# Patient Record
Sex: MALE | Race: WHITE | NOT HISPANIC OR LATINO | Employment: OTHER | URBAN - NONMETROPOLITAN AREA
[De-identification: names, ages, dates, MRNs, and addresses within clinical notes are randomized per-mention and may not be internally consistent; named-entity substitution may affect disease eponyms.]

---

## 2020-08-28 ENCOUNTER — APPOINTMENT (OUTPATIENT)
Dept: CT IMAGING | Facility: HOSPITAL | Age: 50
End: 2020-08-28

## 2020-08-28 ENCOUNTER — HOSPITAL ENCOUNTER (EMERGENCY)
Facility: HOSPITAL | Age: 50
Discharge: HOME | End: 2020-08-28
Attending: FAMILY MEDICINE | Admitting: FAMILY MEDICINE

## 2020-08-28 VITALS
HEART RATE: 74 BPM | DIASTOLIC BLOOD PRESSURE: 84 MMHG | RESPIRATION RATE: 17 BRPM | SYSTOLIC BLOOD PRESSURE: 142 MMHG | TEMPERATURE: 98.2 F | OXYGEN SATURATION: 98 %

## 2020-08-28 DIAGNOSIS — S02.2XXA CLOSED FRACTURE OF NASAL BONE, INITIAL ENCOUNTER: Primary | ICD-10-CM

## 2020-08-28 DIAGNOSIS — S01.511A LACERATION OF INTRAORAL SURFACE OF LIP, INITIAL ENCOUNTER: ICD-10-CM

## 2020-08-28 DIAGNOSIS — S01.21XA LACERATION OF NOSE, INITIAL ENCOUNTER: ICD-10-CM

## 2020-08-28 LAB — VALPROATE SERPL-MCNC: 83.7 MCG/ML (ref 50–125)

## 2020-08-28 PROCEDURE — 99284 EMERGENCY DEPT VISIT MOD MDM: CPT

## 2020-08-28 PROCEDURE — 70486 CT MAXILLOFACIAL W/O DYE: CPT

## 2020-08-28 PROCEDURE — 80164 ASSAY DIPROPYLACETIC ACD TOT: CPT | Performed by: FAMILY MEDICINE

## 2020-08-28 PROCEDURE — 96374 THER/PROPH/DIAG INJ IV PUSH: CPT

## 2020-08-28 PROCEDURE — 70450 CT HEAD/BRAIN W/O DYE: CPT

## 2020-08-28 PROCEDURE — 72125 CT NECK SPINE W/O DYE: CPT

## 2020-08-28 PROCEDURE — 96375 TX/PRO/DX INJ NEW DRUG ADDON: CPT

## 2020-08-28 PROCEDURE — 25010000002 LORAZEPAM PER 2 MG: Performed by: NURSE PRACTITIONER

## 2020-08-28 PROCEDURE — 25010000002 KETOROLAC TROMETHAMINE PER 15 MG: Performed by: NURSE PRACTITIONER

## 2020-08-28 PROCEDURE — 96376 TX/PRO/DX INJ SAME DRUG ADON: CPT

## 2020-08-28 RX ORDER — CEPHALEXIN 500 MG/1
500 CAPSULE ORAL 2 TIMES DAILY
Qty: 20 CAPSULE | Refills: 0 | Status: SHIPPED | OUTPATIENT
Start: 2020-08-28 | End: 2020-09-07

## 2020-08-28 RX ORDER — DOCUSATE CALCIUM 240 MG
240 CAPSULE ORAL 2 TIMES DAILY
COMMUNITY

## 2020-08-28 RX ORDER — DIAPER,BRIEF,INFANT-TODD,DISP
EACH MISCELLANEOUS ONCE
Status: COMPLETED | OUTPATIENT
Start: 2020-08-28 | End: 2020-08-28

## 2020-08-28 RX ORDER — KETOROLAC TROMETHAMINE 30 MG/ML
30 INJECTION, SOLUTION INTRAMUSCULAR; INTRAVENOUS EVERY 6 HOURS PRN
Status: DISCONTINUED | OUTPATIENT
Start: 2020-08-28 | End: 2020-08-28 | Stop reason: HOSPADM

## 2020-08-28 RX ORDER — DIVALPROEX SODIUM 500 MG/1
1000 TABLET, EXTENDED RELEASE ORAL DAILY
COMMUNITY

## 2020-08-28 RX ORDER — PREDNISOLONE ACETATE 10 MG/ML
1 SUSPENSION/ DROPS OPHTHALMIC 4 TIMES DAILY
COMMUNITY

## 2020-08-28 RX ORDER — LORAZEPAM 2 MG/ML
1 INJECTION INTRAMUSCULAR ONCE
Status: COMPLETED | OUTPATIENT
Start: 2020-08-28 | End: 2020-08-28

## 2020-08-28 RX ORDER — MELATONIN
1000 DAILY
COMMUNITY

## 2020-08-28 RX ORDER — SODIUM CHLORIDE 0.9 % (FLUSH) 0.9 %
10 SYRINGE (ML) INJECTION AS NEEDED
Status: DISCONTINUED | OUTPATIENT
Start: 2020-08-28 | End: 2020-08-28 | Stop reason: HOSPADM

## 2020-08-28 RX ORDER — LIDOCAINE HYDROCHLORIDE 10 MG/ML
10 INJECTION, SOLUTION EPIDURAL; INFILTRATION; INTRACAUDAL; PERINEURAL ONCE
Status: COMPLETED | OUTPATIENT
Start: 2020-08-28 | End: 2020-08-28

## 2020-08-28 RX ORDER — SIMETHICONE 125 MG
125 TABLET,CHEWABLE ORAL EVERY 6 HOURS PRN
COMMUNITY

## 2020-08-28 RX ORDER — TRAMADOL HYDROCHLORIDE 50 MG/1
50 TABLET ORAL EVERY 6 HOURS PRN
Qty: 12 TABLET | Refills: 0 | Status: SHIPPED | OUTPATIENT
Start: 2020-08-28

## 2020-08-28 RX ADMIN — KETOROLAC TROMETHAMINE 30 MG: 30 INJECTION, SOLUTION INTRAMUSCULAR at 19:35

## 2020-08-28 RX ADMIN — LORAZEPAM 1 MG: 2 INJECTION INTRAMUSCULAR; INTRAVENOUS at 18:20

## 2020-08-28 RX ADMIN — LORAZEPAM 1 MG: 2 INJECTION INTRAMUSCULAR; INTRAVENOUS at 14:38

## 2020-08-28 RX ADMIN — BACITRACIN: 500 OINTMENT TOPICAL at 18:26

## 2020-08-28 RX ADMIN — LIDOCAINE HYDROCHLORIDE 10 ML: 10 INJECTION, SOLUTION EPIDURAL; INFILTRATION; INTRACAUDAL; PERINEURAL at 18:30

## 2020-09-08 ENCOUNTER — OFFICE VISIT (OUTPATIENT)
Dept: OTOLARYNGOLOGY | Facility: CLINIC | Age: 50
End: 2020-09-08

## 2020-09-08 VITALS — TEMPERATURE: 97.4 F | BODY MASS INDEX: 19.66 KG/M2 | WEIGHT: 118 LBS | HEIGHT: 65 IN

## 2020-09-08 DIAGNOSIS — S02.2XXB OPEN FRACTURE OF NASAL BONE, INITIAL ENCOUNTER: Primary | ICD-10-CM

## 2020-09-08 PROCEDURE — 99202 OFFICE O/P NEW SF 15 MIN: CPT | Performed by: OTOLARYNGOLOGY

## 2020-09-08 NOTE — PROGRESS NOTES
"Subjective   Clarke Cunningham Jr. is a 49 y.o. male.     History of Present Illness   Patient is a resident of [facility] and is noncommunicative.  History is obtained through the medical record.  Patient reportedly had falls with nasal injury including a laceration that was left open.  Has a history of multiple injuries.    The following portions of the patient's history were reviewed and updated as appropriate: allergies, current medications, past family history, past medical history, past social history, past surgical history and problem list.      Clarke Cunningham Jr. reports that he has never smoked. He does not have any smokeless tobacco history on file. He reports that he drank alcohol. He reports that he has current or past drug history.  Patient is not a tobacco user and has not been counseled for use of tobacco products    History reviewed. No pertinent family history.    Allergies   Allergen Reactions   • Influenza Vaccines Other (See Comments)         • Lithium Other (See Comments)     \"Intolerance\"         Current Outpatient Medications:   •  Calcium Carb-Cholecalciferol (CALCIUM-VITAMIN D) 600-400 MG-UNIT tablet, Take  by mouth., Disp: , Rfl:   •  cholecalciferol (VITAMIN D3) 25 MCG (1000 UT) tablet, Take 1,000 Units by mouth Daily., Disp: , Rfl:   •  divalproex (DEPAKOTE) 500 MG 24 hr tablet, Take 1,000 mg by mouth Daily., Disp: , Rfl:   •  docusate calcium (SURFAK) 240 MG capsule, Take 240 mg by mouth 2 (Two) Times a Day., Disp: , Rfl:   •  prednisoLONE acetate (PRED FORTE) 1 % ophthalmic suspension, 1 drop 4 (Four) Times a Day., Disp: , Rfl:   •  simethicone (MYLICON) 125 MG chewable tablet, Chew 125 mg Every 6 (Six) Hours As Needed for Flatulence., Disp: , Rfl:   •  traMADol (ULTRAM) 50 MG tablet, Take 1 tablet by mouth Every 6 (Six) Hours As Needed for Moderate Pain ., Disp: 12 tablet, Rfl: 0    Past Medical History:   Diagnosis Date   • Anemia    • Bipolar 1 disorder (CMS/HCC)    • Cerebral " palsy (CMS/HCC)    • Congenital prolapsed rectum    • Constipation    • Drug induced myotonia    • GERD (gastroesophageal reflux disease)    • Intellectual disability    • Osteopenia    • Osteoporosis    • Pes planus    • Thrombocytopenia (CMS/HCC)    • Vitamin D deficiency        Past Surgical History:   Procedure Laterality Date   • CATARACT EXTRACTION           Review of Systems   Unable to perform ROS: Patient nonverbal           Objective   Physical Exam  General: Nonverbal male in no acute distress.  No stridor or stertor.  Zygomatic arches appear to be without step-off.  Orbital rims appear intact.  Ears: No gross discharge  Nose: There is a laceration to the left of midline that is crusted but without purulence and appears to be healing appropriately.  Nasal dorsum shows evidence of multiple fractures but is not significantly displaced.  Intranasally there is no evidence of hematoma.  Oral cavity: Patient would not allow examination.  Neck: No adenopathy.  No abnormal masses in the parotid or submandibular ducts.        Assessment/Plan   Clarke was seen today for nasal fracture.    Diagnoses and all orders for this visit:    Open fracture of nasal bone, initial encounter      Plan: Patient appears to be healing appropriately without intervention and no evidence of septal hematoma.  Would not recommend any surgery at this point.  May follow-up with me as needed.

## 2022-02-09 DIAGNOSIS — Z12.11 ENCOUNTER FOR SCREENING COLONOSCOPY: Primary | ICD-10-CM

## 2022-04-18 ENCOUNTER — OFFICE VISIT (OUTPATIENT)
Dept: GASTROENTEROLOGY | Facility: CLINIC | Age: 52
End: 2022-04-18

## 2022-04-18 VITALS
WEIGHT: 117 LBS | TEMPERATURE: 97.7 F | DIASTOLIC BLOOD PRESSURE: 70 MMHG | HEIGHT: 65 IN | HEART RATE: 98 BPM | SYSTOLIC BLOOD PRESSURE: 110 MMHG | BODY MASS INDEX: 19.49 KG/M2

## 2022-04-18 DIAGNOSIS — Z12.11 ENCOUNTER FOR SCREENING FOR MALIGNANT NEOPLASM OF COLON: Primary | ICD-10-CM

## 2022-04-18 PROCEDURE — S0260 H&P FOR SURGERY: HCPCS | Performed by: NURSE PRACTITIONER

## 2022-04-18 NOTE — PROGRESS NOTES
Chief Complaint   Patient presents with   • Colonoscopy     Screening colon       PCP: Provider, No Known  REFER: Leigh Hough AP*    Subjective     HPI    Clarke Cunningham Jr. is a 51 y.o. male who presents to office for preventative maintenance. He is unable to provide meaningful history.  Information obtained through nurse from group home that is present with him.  His father is POA.   There is not  a personal history of colon polyps.  There is not a history of colon cancer.  .    His last colonoscopy-no previous colonoscopy   Unknown family history of colon cancer or colon polyps.       Past Medical History:   Diagnosis Date   • Anemia    • Bipolar 1 disorder (HCC)    • Cerebral palsy (HCC)    • Congenital prolapsed rectum    • Constipation    • Drug induced myotonia    • GERD (gastroesophageal reflux disease)    • Intellectual disability    • Osteopenia    • Osteoporosis    • Pes planus    • Thrombocytopenia (HCC)    • Vitamin D deficiency      Past Surgical History:   Procedure Laterality Date   • CATARACT EXTRACTION       Outpatient Medications Marked as Taking for the 4/18/22 encounter (Office Visit) with Eliezer Fletcher APRN   Medication Sig Dispense Refill   • Calcium Carb-Cholecalciferol (CALCIUM-VITAMIN D) 600-400 MG-UNIT tablet Take  by mouth.     • cholecalciferol (VITAMIN D3) 25 MCG (1000 UT) tablet Take 1,000 Units by mouth Daily.     • divalproex (DEPAKOTE) 500 MG 24 hr tablet Take 1,000 mg by mouth Daily.     • docusate calcium (SURFAK) 240 MG capsule Take 240 mg by mouth 2 (Two) Times a Day.     • prednisoLONE acetate (PRED FORTE) 1 % ophthalmic suspension 1 drop 4 (Four) Times a Day.     • simethicone (MYLICON) 125 MG chewable tablet Chew 125 mg Every 6 (Six) Hours As Needed for Flatulence.     • traMADol (ULTRAM) 50 MG tablet Take 1 tablet by mouth Every 6 (Six) Hours As Needed for Moderate Pain . 12 tablet 0     Allergies   Allergen Reactions   • Influenza Vaccines Other (See  "Comments)         • Lithium Other (See Comments)     \"Intolerance\"     Social History     Socioeconomic History   • Marital status: Single   Tobacco Use   • Smoking status: Never Smoker   • Smokeless tobacco: Never Used   Vaping Use   • Vaping Use: Never used   Substance and Sexual Activity   • Alcohol use: Never   • Drug use: Never   • Sexual activity: Not Currently     Review of Systems     Unable to obtain complete ROS due to mental status    Objective   Vitals:    04/18/22 1322   BP: 110/70   Pulse: 98   Temp: 97.7 °F (36.5 °C)     Physical Exam  Constitutional:       Appearance: Normal appearance. He is well-developed.   Eyes:      General: No scleral icterus.  Cardiovascular:      Rate and Rhythm: Regular rhythm.      Heart sounds: Normal heart sounds. No murmur heard.  Pulmonary:      Effort: Pulmonary effort is normal. No accessory muscle usage.      Breath sounds: Normal breath sounds.   Abdominal:      General: Bowel sounds are normal. There is no distension.      Palpations: Abdomen is soft. There is no mass.      Tenderness: There is no abdominal tenderness. There is no guarding or rebound.   Skin:     General: Skin is warm and dry.      Coloration: Skin is not jaundiced.   Neurological:      Mental Status: He is alert.   Psychiatric:         Behavior: Behavior is cooperative.         Cognition and Memory: Cognition is impaired.         Judgment: Judgment is inappropriate.       Imaging Results (Most Recent)     None        Body mass index is 19.77 kg/m².    Assessment/Plan   Diagnoses and all orders for this visit:    1. Encounter for screening for malignant neoplasm of colon (Primary)  -     Case Request; Standing  -     Case Request    Other orders  -     polyethylene glycol (GoLYTELY) 236 g solution; Take 3,785 mL by mouth 1 (One) Time for 1 dose. Take as directed  Dispense: 3350 mL; Refill: 0      COLONOSCOPY WITH ANESTHESIA (N/A)      Clarke Cunningham Jr. states he has a rectal prolapse and " believes Clarke Cunningham Jr. will consume prep for colonoscopy.  She states he does not have difficulty walking and assured he would be supervised during prep for colonoscopy.  I expressed my concerns about a fall.  I will call Clarke Cunningham Jr. father to obtain appropriate consent for colonoscopy and further discuss risks/benefits/indications of colonoscopy.      All risks, benefits, alternatives, and indications of colonoscopy procedure have been discussed with the patient. Risks to include perforation of the colon requiring possible surgery or colostomy, risk of bleeding from biopsies or removal of colon tissue, possibility of missing a colon polyp or cancer, or adverse drug reaction.  Benefits to include the diagnosis and management of disease of the colon and rectum. Alternatives to include barium enema, radiographic evaluation, lab testing or no intervention.      Eliezer Fletcher, APRN  04/18/22        There are no Patient Instructions on file for this visit.

## 2022-04-19 PROBLEM — Z12.11 ENCOUNTER FOR SCREENING FOR MALIGNANT NEOPLASM OF COLON: Status: ACTIVE | Noted: 2022-04-19

## 2022-05-04 ENCOUNTER — ANESTHESIA (OUTPATIENT)
Dept: GASTROENTEROLOGY | Facility: HOSPITAL | Age: 52
End: 2022-05-04

## 2022-05-04 ENCOUNTER — HOSPITAL ENCOUNTER (OUTPATIENT)
Facility: HOSPITAL | Age: 52
Setting detail: HOSPITAL OUTPATIENT SURGERY
Discharge: HOME | End: 2022-05-04
Attending: INTERNAL MEDICINE | Admitting: INTERNAL MEDICINE

## 2022-05-04 ENCOUNTER — TELEPHONE (OUTPATIENT)
Dept: GASTROENTEROLOGY | Facility: CLINIC | Age: 52
End: 2022-05-04

## 2022-05-04 ENCOUNTER — ANESTHESIA EVENT (OUTPATIENT)
Dept: GASTROENTEROLOGY | Facility: HOSPITAL | Age: 52
End: 2022-05-04

## 2022-05-04 VITALS
OXYGEN SATURATION: 96 % | HEIGHT: 65 IN | DIASTOLIC BLOOD PRESSURE: 72 MMHG | TEMPERATURE: 97.4 F | BODY MASS INDEX: 19.49 KG/M2 | WEIGHT: 117 LBS | RESPIRATION RATE: 18 BRPM | SYSTOLIC BLOOD PRESSURE: 98 MMHG | HEART RATE: 43 BPM

## 2022-05-04 DIAGNOSIS — Z12.11 ENCOUNTER FOR SCREENING FOR MALIGNANT NEOPLASM OF COLON: ICD-10-CM

## 2022-05-04 PROCEDURE — 25010000002 PROPOFOL 10 MG/ML EMULSION: Performed by: NURSE ANESTHETIST, CERTIFIED REGISTERED

## 2022-05-04 PROCEDURE — G0121 COLON CA SCRN NOT HI RSK IND: HCPCS | Performed by: INTERNAL MEDICINE

## 2022-05-04 RX ORDER — LIDOCAINE HYDROCHLORIDE 10 MG/ML
0.5 INJECTION, SOLUTION EPIDURAL; INFILTRATION; INTRACAUDAL; PERINEURAL ONCE AS NEEDED
Status: CANCELLED | OUTPATIENT
Start: 2022-05-04

## 2022-05-04 RX ORDER — SODIUM CHLORIDE 9 MG/ML
500 INJECTION, SOLUTION INTRAVENOUS CONTINUOUS PRN
Status: DISCONTINUED | OUTPATIENT
Start: 2022-05-04 | End: 2022-05-04 | Stop reason: HOSPADM

## 2022-05-04 RX ORDER — PROPOFOL 10 MG/ML
VIAL (ML) INTRAVENOUS AS NEEDED
Status: DISCONTINUED | OUTPATIENT
Start: 2022-05-04 | End: 2022-05-04 | Stop reason: SURG

## 2022-05-04 RX ORDER — SODIUM CHLORIDE 0.9 % (FLUSH) 0.9 %
10 SYRINGE (ML) INJECTION AS NEEDED
Status: DISCONTINUED | OUTPATIENT
Start: 2022-05-04 | End: 2022-05-04 | Stop reason: HOSPADM

## 2022-05-04 RX ADMIN — PROPOFOL 60 MG: 10 INJECTION, EMULSION INTRAVENOUS at 10:12

## 2022-05-04 RX ADMIN — SODIUM CHLORIDE 500 ML: 9 INJECTION, SOLUTION INTRAVENOUS at 09:06

## 2022-05-04 NOTE — ANESTHESIA POSTPROCEDURE EVALUATION
Patient: Clarke Cunningham Jr.    Procedure Summary     Date: 05/04/22 Room / Location: [facility] ENDOSCOPY 5 / [facility] ENDOSCOPY    Anesthesia Start: 1011 Anesthesia Stop: 1013    Procedure: COLONOSCOPY WITH ANESTHESIA (N/A ) Diagnosis:       Encounter for screening for malignant neoplasm of colon      (Encounter for screening for malignant neoplasm of colon [Z12.11])    Surgeons: Bobby Montes DO Provider: Sean Linares CRNA    Anesthesia Type: MAC ASA Status: 3          Anesthesia Type: MAC    Vitals  Vitals Value Taken Time   BP 87/56 05/04/22 1026   Temp     Pulse 45 05/04/22 1031   Resp 20 05/04/22 1020   SpO2 95 % 05/04/22 1031   Vitals shown include unvalidated device data.        Post Anesthesia Care and Evaluation    Patient location during evaluation: PHASE II  Patient participation: complete - patient participated  Level of consciousness: awake and alert  Pain management: adequate  Airway patency: patent  Anesthetic complications: No anesthetic complications  PONV Status: none  Cardiovascular status: acceptable and stable  Respiratory status: acceptable and unassisted  Hydration status: acceptable

## 2022-05-04 NOTE — ANESTHESIA PREPROCEDURE EVALUATION
Anesthesia Evaluation     Patient summary reviewed   no history of anesthetic complications:  NPO Solid Status: > 8 hours  NPO Liquid Status: > 2 hours           Airway   Mallampati: II  TM distance: >3 FB  Neck ROM: full  No difficulty expected  Dental    (+) poor dentition        Pulmonary    (-) asthma, sleep apnea, not a smoker  Cardiovascular - negative cardio ROS  Exercise tolerance: good (4-7 METS)        Neuro/Psych  (+) psychiatric history Bipolar,      ROS Comment: CP  Intellectual disability  GI/Hepatic/Renal/Endo    (+)  GERD,      Musculoskeletal     Abdominal    Substance History      OB/GYN          Other        ROS/Med Hx Other: Congenital prolapsed rectum                Anesthesia Plan    ASA 3     MAC     intravenous induction     Plan/risks discussed with: caregiver at bedside, POA previously signed consent.        CODE STATUS:

## 2022-10-31 ENCOUNTER — APPOINTMENT (OUTPATIENT)
Dept: CT IMAGING | Facility: HOSPITAL | Age: 52
End: 2022-10-31

## 2022-10-31 ENCOUNTER — HOSPITAL ENCOUNTER (EMERGENCY)
Facility: HOSPITAL | Age: 52
Discharge: HOME | End: 2022-10-31
Attending: EMERGENCY MEDICINE | Admitting: EMERGENCY MEDICINE

## 2022-10-31 VITALS
DIASTOLIC BLOOD PRESSURE: 86 MMHG | OXYGEN SATURATION: 98 % | HEIGHT: 65 IN | SYSTOLIC BLOOD PRESSURE: 131 MMHG | WEIGHT: 120 LBS | TEMPERATURE: 97.7 F | HEART RATE: 70 BPM | RESPIRATION RATE: 16 BRPM | BODY MASS INDEX: 19.99 KG/M2

## 2022-10-31 DIAGNOSIS — R22.0 SWELLING OF UPPER LIP: Primary | ICD-10-CM

## 2022-10-31 DIAGNOSIS — S42.002A CLOSED NONDISPLACED FRACTURE OF LEFT CLAVICLE, UNSPECIFIED PART OF CLAVICLE, INITIAL ENCOUNTER: ICD-10-CM

## 2022-10-31 PROCEDURE — 70486 CT MAXILLOFACIAL W/O DYE: CPT

## 2022-10-31 PROCEDURE — 70450 CT HEAD/BRAIN W/O DYE: CPT

## 2022-10-31 PROCEDURE — 99283 EMERGENCY DEPT VISIT LOW MDM: CPT

## 2022-10-31 PROCEDURE — 72125 CT NECK SPINE W/O DYE: CPT

## 2022-10-31 RX ORDER — IBUPROFEN 600 MG/1
600 TABLET ORAL ONCE
Status: COMPLETED | OUTPATIENT
Start: 2022-10-31 | End: 2022-10-31

## 2022-10-31 RX ORDER — TRAMADOL HYDROCHLORIDE 50 MG/1
50 TABLET ORAL ONCE
Status: COMPLETED | OUTPATIENT
Start: 2022-10-31 | End: 2022-10-31

## 2022-10-31 RX ADMIN — TRAMADOL HYDROCHLORIDE 50 MG: 50 TABLET, FILM COATED ORAL at 10:17

## 2022-10-31 RX ADMIN — IBUPROFEN 600 MG: 600 TABLET, FILM COATED ORAL at 10:16

## 2022-11-11 ENCOUNTER — OFFICE VISIT (OUTPATIENT)
Dept: ORTHOPEDIC SURGERY | Facility: CLINIC | Age: 52
End: 2022-11-11
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
## 2022-11-11 DIAGNOSIS — S42.022S CLOSED DISPLACED FRACTURE OF SHAFT OF LEFT CLAVICLE, SEQUELA: Primary | ICD-10-CM

## 2022-11-11 DIAGNOSIS — M89.8X1 PAIN OF LEFT CLAVICLE: Primary | ICD-10-CM

## 2022-11-11 PROBLEM — S42.002A CLOSED NONDISPLACED FRACTURE OF LEFT CLAVICLE: Status: ACTIVE | Noted: 2022-11-11

## 2022-11-11 PROCEDURE — 99203 OFFICE O/P NEW LOW 30 MIN: CPT | Performed by: NURSE PRACTITIONER

## 2022-11-11 NOTE — PROGRESS NOTES
"Clarke Cunningham Jr. is a 51 y.o. male   Primary provider:  Provider, No Known       Chief Complaint   Patient presents with   • Left Clavicle - Fracture, Initial Evaluation       HISTORY OF PRESENT ILLNESS:    Mr. Cunningham is a pleasant 51-year-old male, he is accompanied with his .  Patient has mental disabilities and requires full-time care.  His  reports patient fell on 10/30/2022.  At that time it seemed that most of his pain was coming from his face, however a shoulder x-ray was performed and showed a clavicle fracture.  At first  reports it was unsure if this was a new or an old injury, however she reports [facility] found old x-ray reports to show that clavicle fracture had been present previously.  She reports patient seems to have no problem using left arm.  Patient has not been showing any signs of pain.  He is sent for new x-rays today.        Arm Injury   The incident occurred more than 1 week ago. The injury mechanism was a fall. The pain is present in the left clavicle.        CONCURRENT MEDICAL HISTORY:    Past Medical History:   Diagnosis Date   • Anemia    • Bipolar 1 disorder (HCC)    • Cerebral palsy (HCC)    • Congenital prolapsed rectum    • Constipation    • Drug induced myotonia    • GERD (gastroesophageal reflux disease)    • Intellectual disability    • Osteopenia    • Osteoporosis    • Pes planus    • Thrombocytopenia (HCC)    • Vitamin D deficiency        Allergies   Allergen Reactions   • Influenza Vaccines Other (See Comments)         • Lithium Other (See Comments)     \"Intolerance\"         Current Outpatient Medications:   •  Calcium Carb-Cholecalciferol (CALCIUM-VITAMIN D) 600-400 MG-UNIT tablet, Take  by mouth., Disp: , Rfl:   •  cholecalciferol (VITAMIN D3) 25 MCG (1000 UT) tablet, Take 1,000 Units by mouth Daily., Disp: , Rfl:   •  divalproex (DEPAKOTE) 500 MG 24 hr tablet, Take 1,000 mg by mouth Daily., Disp: , Rfl:   •  docusate calcium " (SURFAK) 240 MG capsule, Take 240 mg by mouth 2 (Two) Times a Day., Disp: , Rfl:   •  prednisoLONE acetate (PRED FORTE) 1 % ophthalmic suspension, 1 drop 4 (Four) Times a Day., Disp: , Rfl:   •  simethicone (MYLICON) 125 MG chewable tablet, Chew 125 mg Every 6 (Six) Hours As Needed for Flatulence., Disp: , Rfl:   •  traMADol (ULTRAM) 50 MG tablet, Take 1 tablet by mouth Every 6 (Six) Hours As Needed for Moderate Pain ., Disp: 12 tablet, Rfl: 0    Past Surgical History:   Procedure Laterality Date   • CATARACT EXTRACTION     • COLONOSCOPY     • COLONOSCOPY N/A 5/4/2022    Procedure: COLONOSCOPY WITH ANESTHESIA;  Surgeon: Bobby Montes DO;  Location: [facility] ENDOSCOPY;  Service: Gastroenterology;  Laterality: N/A;  pre screen  post inadequate prep  pcp vidal amos       History reviewed. No pertinent family history.     Social History     Socioeconomic History   • Marital status: Single   Tobacco Use   • Smoking status: Never   • Smokeless tobacco: Never   Vaping Use   • Vaping Use: Never used   Substance and Sexual Activity   • Alcohol use: Never   • Drug use: Never   • Sexual activity: Not Currently        Review of Systems   Constitutional: Negative.    HENT: Negative.    Eyes: Negative.    Respiratory: Negative.    Cardiovascular: Negative.    Gastrointestinal: Negative.    Endocrine: Negative.    Genitourinary: Negative.    Musculoskeletal: Negative.    Skin: Negative.    Allergic/Immunologic: Negative.    Neurological: Negative.    Hematological: Negative.    Psychiatric/Behavioral: Negative.        PHYSICAL EXAMINATION:       There were no vitals taken for this visit.    Physical Exam  Vitals and nursing note reviewed.   Constitutional:       General: He is not in acute distress.     Appearance: He is well-developed. He is not toxic-appearing.   HENT:      Head: Normocephalic.   Pulmonary:      Effort: Pulmonary effort is normal. No respiratory distress.   Skin:     General: Skin is warm and dry.    Neurological:      Mental Status: Mental status is at baseline.   Psychiatric:         Speech: He is noncommunicative.         GAIT:     []  Normal  []  Antalgic    Assistive device: []  None  []  Walker     []  Crutches  []  Cane     [x]  Wheelchair  []  Stretcher    Left Shoulder Exam     Comments:  Disabilities make it difficult to perform formal exam, however patient is seen using left arm today with no issues or outward appearances of pain.  Fingers are warm, pink, good capillary refill and normal sensation.  He has no outward appearances of pain with palpation of left clavicle.                CT Cervical Spine Without Contrast    Result Date: 10/31/2022  Narrative: CT C-spine October 31, 2022 INDICATION: Posttraumatic pain TECHNIQUE: Spiral images obtained through the neck. Sagittal, axial and coronal reformatted images generated retrospectively. This exam was performed according to our departmental dose-optimization program, which includes automated exposure control, adjustment of the mA and/or kV according to patient size and/or use of iterative reconstruction technique. FINDINGS: Study rather limited secondary to patient motion artifact degrading image quality. No definite acute fracture, subluxation or precervical soft tissue swelling. Atlantoaxial articulation grossly normal. Stable lucency posterior C6 vertebra compared with October 28, 2020. Mild disc space narrowing and hypertrophic spurring diffusely. No evidence of jumped facet.     Impression: Rather limited study secondary to patient cooperation issues. No definite acute or worrisome focal bony abnormality. Electronically signed by:  Sean Mc MD  10/31/2022 12:08 PM CDT Workstation: [id number]        ASSESSMENT:    Diagnoses and all orders for this visit:    Closed displaced fracture of shaft of left clavicle, sequela    Accidental fall, initial encounter          PLAN      Clinical history and exam suggest the displaced fracture of the left  clavicle is an old injury.  Request that  sign paperwork for me to obtain records from [facility] so that this may be well documented.  Signs and symptoms to report and when to seek care explained.  Patient's  verbalized understanding.  Patient to return to this office as needed.    Return if symptoms worsen or fail to improve.    EMR Dragon/Transciption Disclaimer: Some of this note may be an electronic transcription/translation of spoken language to printed text.  The electronic translation of spoken language may permit erroneous, or at times, nonsensical words or phrases to be inadvertently transcribed. Although I have reviewed the note for such errors, some may still exist.       Time spent of a minimum of 30 minutes including the face to face evaluation, reviewing of medical history and prior medial records, reviewing of diagnostic studies, ordering additional tests, documentation, patient education and coordination of care.         This document has been electronically signed by Heidi RANDOLPH on November 11, 2022 09:59 CST

## 2023-05-09 ENCOUNTER — APPOINTMENT (OUTPATIENT)
Dept: GENERAL RADIOLOGY | Facility: HOSPITAL | Age: 53
End: 2023-05-09
Payer: MEDICARE

## 2023-05-09 ENCOUNTER — APPOINTMENT (OUTPATIENT)
Dept: CT IMAGING | Facility: HOSPITAL | Age: 53
End: 2023-05-09
Payer: MEDICARE

## 2023-05-09 ENCOUNTER — HOSPITAL ENCOUNTER (EMERGENCY)
Facility: HOSPITAL | Age: 53
Discharge: HOME | End: 2023-05-09
Attending: STUDENT IN AN ORGANIZED HEALTH CARE EDUCATION/TRAINING PROGRAM | Admitting: STUDENT IN AN ORGANIZED HEALTH CARE EDUCATION/TRAINING PROGRAM
Payer: MEDICARE

## 2023-05-09 VITALS
TEMPERATURE: 100.3 F | DIASTOLIC BLOOD PRESSURE: 58 MMHG | SYSTOLIC BLOOD PRESSURE: 122 MMHG | RESPIRATION RATE: 20 BRPM | HEART RATE: 80 BPM | OXYGEN SATURATION: 98 %

## 2023-05-09 DIAGNOSIS — J18.9 PNEUMONIA OF LEFT LOWER LOBE DUE TO INFECTIOUS ORGANISM: Primary | ICD-10-CM

## 2023-05-09 LAB
ALBUMIN SERPL-MCNC: 4 G/DL (ref 3.5–5.2)
ALBUMIN/GLOB SERPL: 1 G/DL
ALP SERPL-CCNC: 90 U/L (ref 39–117)
ALT SERPL W P-5'-P-CCNC: 18 U/L (ref 1–41)
ANION GAP SERPL CALCULATED.3IONS-SCNC: 11 MMOL/L (ref 5–15)
AST SERPL-CCNC: 18 U/L (ref 1–40)
BASOPHILS # BLD AUTO: 0.04 10*3/MM3 (ref 0–0.2)
BASOPHILS NFR BLD AUTO: 0.3 % (ref 0–1.5)
BILIRUB SERPL-MCNC: 0.3 MG/DL (ref 0–1.2)
BUN SERPL-MCNC: 22 MG/DL (ref 6–20)
BUN/CREAT SERPL: 29.3 (ref 7–25)
CALCIUM SPEC-SCNC: 9.9 MG/DL (ref 8.6–10.5)
CHLORIDE SERPL-SCNC: 100 MMOL/L (ref 98–107)
CO2 SERPL-SCNC: 27 MMOL/L (ref 22–29)
CREAT SERPL-MCNC: 0.75 MG/DL (ref 0.76–1.27)
DEPRECATED RDW RBC AUTO: 49 FL (ref 37–54)
EGFRCR SERPLBLD CKD-EPI 2021: 109.3 ML/MIN/1.73
EOSINOPHIL # BLD AUTO: 0 10*3/MM3 (ref 0–0.4)
EOSINOPHIL NFR BLD AUTO: 0 % (ref 0.3–6.2)
ERYTHROCYTE [DISTWIDTH] IN BLOOD BY AUTOMATED COUNT: 13.7 % (ref 12.3–15.4)
GLOBULIN UR ELPH-MCNC: 4.1 GM/DL
GLUCOSE SERPL-MCNC: 99 MG/DL (ref 65–99)
HCT VFR BLD AUTO: 36.3 % (ref 37.5–51)
HGB BLD-MCNC: 11.9 G/DL (ref 13–17.7)
HOLD SPECIMEN: NORMAL
IMM GRANULOCYTES # BLD AUTO: 0.08 10*3/MM3 (ref 0–0.05)
IMM GRANULOCYTES NFR BLD AUTO: 0.5 % (ref 0–0.5)
LYMPHOCYTES # BLD AUTO: 0.67 10*3/MM3 (ref 0.7–3.1)
LYMPHOCYTES NFR BLD AUTO: 4.5 % (ref 19.6–45.3)
MCH RBC QN AUTO: 31.3 PG (ref 26.6–33)
MCHC RBC AUTO-ENTMCNC: 32.8 G/DL (ref 31.5–35.7)
MCV RBC AUTO: 95.5 FL (ref 79–97)
MONOCYTES # BLD AUTO: 1.44 10*3/MM3 (ref 0.1–0.9)
MONOCYTES NFR BLD AUTO: 9.7 % (ref 5–12)
NEUTROPHILS NFR BLD AUTO: 12.6 10*3/MM3 (ref 1.7–7)
NEUTROPHILS NFR BLD AUTO: 85 % (ref 42.7–76)
NRBC BLD AUTO-RTO: 0 /100 WBC (ref 0–0.2)
PLATELET # BLD AUTO: 132 10*3/MM3 (ref 140–450)
PMV BLD AUTO: 10.5 FL (ref 6–12)
POTASSIUM SERPL-SCNC: 4.5 MMOL/L (ref 3.5–5.2)
PROT SERPL-MCNC: 8.1 G/DL (ref 6–8.5)
RBC # BLD AUTO: 3.8 10*6/MM3 (ref 4.14–5.8)
SODIUM SERPL-SCNC: 138 MMOL/L (ref 136–145)
WBC NRBC COR # BLD: 14.83 10*3/MM3 (ref 3.4–10.8)
WHOLE BLOOD HOLD COAG: NORMAL

## 2023-05-09 PROCEDURE — 72128 CT CHEST SPINE W/O DYE: CPT

## 2023-05-09 PROCEDURE — 80053 COMPREHEN METABOLIC PANEL: CPT | Performed by: STUDENT IN AN ORGANIZED HEALTH CARE EDUCATION/TRAINING PROGRAM

## 2023-05-09 PROCEDURE — 72131 CT LUMBAR SPINE W/O DYE: CPT

## 2023-05-09 PROCEDURE — 25510000001 IOPAMIDOL 61 % SOLUTION: Performed by: STUDENT IN AN ORGANIZED HEALTH CARE EDUCATION/TRAINING PROGRAM

## 2023-05-09 PROCEDURE — 99283 EMERGENCY DEPT VISIT LOW MDM: CPT

## 2023-05-09 PROCEDURE — 72125 CT NECK SPINE W/O DYE: CPT

## 2023-05-09 PROCEDURE — 85025 COMPLETE CBC W/AUTO DIFF WBC: CPT | Performed by: STUDENT IN AN ORGANIZED HEALTH CARE EDUCATION/TRAINING PROGRAM

## 2023-05-09 PROCEDURE — 70450 CT HEAD/BRAIN W/O DYE: CPT

## 2023-05-09 PROCEDURE — 74177 CT ABD & PELVIS W/CONTRAST: CPT

## 2023-05-09 PROCEDURE — 71045 X-RAY EXAM CHEST 1 VIEW: CPT

## 2023-05-09 RX ORDER — DOXYCYCLINE 100 MG/1
100 CAPSULE ORAL 2 TIMES DAILY
Qty: 10 CAPSULE | Refills: 0 | Status: SHIPPED | OUTPATIENT
Start: 2023-05-09

## 2023-05-09 RX ADMIN — IOPAMIDOL 90 ML: 612 INJECTION, SOLUTION INTRAVENOUS at 16:41
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
## (undated) DEVICE — YANKAUER,BULB TIP WITH VENT: Brand: ARGYLE

## (undated) DEVICE — Device: Brand: DEFENDO AIR/WATER/SUCTION AND BIOPSY VALVE

## (undated) DEVICE — MASK,OXYGEN,MED CONC,ADLT,7' TUB, UC: Brand: PENDING

## (undated) DEVICE — SENSR O2 OXIMAX FNGR A/ 18IN NONSTR

## (undated) DEVICE — TBG SMPL FLTR LINE NASL 02/C02 A/ BX/100

## (undated) DEVICE — THE CHANNEL CLEANING BRUSH IS A NYLON FLEXI BRUSH ATTACHED TO A FLEXIBLE PLASTIC SHEATH DESIGNED TO SAFELY REMOVE DEBRIS FROM FLEXIBLE ENDOSCOPES.